# Patient Record
Sex: FEMALE | Race: WHITE
[De-identification: names, ages, dates, MRNs, and addresses within clinical notes are randomized per-mention and may not be internally consistent; named-entity substitution may affect disease eponyms.]

---

## 2017-09-13 ENCOUNTER — HOSPITAL ENCOUNTER (EMERGENCY)
Dept: HOSPITAL 10 - FTE | Age: 22
Discharge: HOME | End: 2017-09-13
Payer: COMMERCIAL

## 2017-09-13 VITALS — WEIGHT: 117.95 LBS | HEIGHT: 55 IN | BODY MASS INDEX: 27.3 KG/M2

## 2017-09-13 DIAGNOSIS — N39.0: Primary | ICD-10-CM

## 2017-09-13 LAB
ADD UMIC: YES
BACTERIA #/AREA URNS HPF: (no result) /HPF
COLOR UR: (no result)
GLUCOSE UR STRIP-MCNC: NEGATIVE MG/DL
KETONES UR STRIP.AUTO-MCNC: NEGATIVE MG/DL
NITRITE UR QL STRIP.AUTO: NEGATIVE MG/DL
RBC # UR AUTO: (no result) MG/DL
UR ASCORBIC ACID: NEGATIVE MG/DL
UR BILIRUBIN (DIP): NEGATIVE MG/DL
UR CLARITY: (no result)
UR PH (DIP): 5 (ref 5–9)
UR RBC: 5 /HPF (ref 0–5)
UR SPECIFIC GRAVITY (DIP): 1.01 (ref 1–1.03)
UR TOTAL PROTEIN (DIP): NEGATIVE MG/DL
UROBILINOGEN UR STRIP-ACNC: NEGATIVE MG/DL
WBC # UR STRIP: (no result) LEU/UL

## 2017-09-13 PROCEDURE — 99283 EMERGENCY DEPT VISIT LOW MDM: CPT

## 2017-09-13 PROCEDURE — 36415 COLL VENOUS BLD VENIPUNCTURE: CPT

## 2017-09-13 PROCEDURE — 81001 URINALYSIS AUTO W/SCOPE: CPT

## 2017-09-13 NOTE — ERD
ER Documentation


Chief Complaint


Date/Time


DATE: 17 


TIME: 09:07


Chief Complaint


ABD PAIN, LOOSE STOOLS, NAUSEA, ONSET LAST NIGHT





HPI


22 year old female Presents with lower abdominal pain that is cramping like 

diffuse, with loose stools and nausea beginning last night.  Patient describes 

the pain as being intermittent, and also reports possibly some stinging pain 

when she urinates.  She denies any fevers or chills.





ROS


All systems reviewed and are negative except as per history of present illness.





Medications


Home Meds


Active Scripts


Nitrofurantoin Monohyd Macrocr* (Macrobid*) 100 Mg Capsr, 100 MG PO BID for 7 

Days, CAP


   Prov:LIZ MCCLAIN PA-C         17


Ondansetron (Ondansetron Odt) 4 Mg Tab.rapdis, 4 MG PO Q6H Y for NAUSEA AND/OR 

VOMITING, #10 TAB


   Prov:LIZ MCCLAIN PA-C         17


Naproxen* (Naprosyn*) 500 Mg Tablet, 500 MG PO BID Y for PAIN AND/OR 

INFLAMMATION, #30 TAB


   Prov:LIZ MCCLAIN PA-C         16





Allergies


Allergies:  


Coded Allergies:  


     No Known Allergy (Unverified , 17)





PMhx/Soc


History of Surgery:  Yes (APPY ,)


Anesthesia Reaction:  No


Hx Neurological Disorder:  No


Hx Respiratory Disorders:  No


Hx Cardiac Disorders:  No


Hx Psychiatric Problems:  No


Hx Miscellaneous Medical Probl:  Yes


Hx Alcohol Use:  No


Hx Substance Use:  No


Hx Tobacco Use:  No


Smoking Status:  Never smoker





Physical Exam


Vitals





Vital Signs








  Date Time  Temp Pulse Resp B/P Pulse Ox O2 Delivery O2 Flow Rate FiO2


 


17 08:46 97.9 80 17 115/75 100   








Physical Exam


General: Well-developed, well-nourished.  The patient appears in no acute 

distress.


HEENT: Head is normocephalic, atraumatic. No scleral icterus


Neck: Supple.  Nontender.


Lungs: Clear to auscultation.  Normal air movement.


Heart: Regular rate and rhythm.  S1 and S2 are normal.  No murmurs, gallops, or 

rubs.


Abdomen: Soft, nontender, nondistended.  Bowel sounds are normoactive.  Scar 

from previous appendectomy present.


Extremities: No clubbing or cyanosis.  Normal pulses. Moving extremities x 4. 

No weakness.


Neurologic: Alert and oriented 3.  No focal deficits.


Skin: Normal turgor.  No rash or lesions.


Results 24 hrs





 Laboratory Tests








Test


  17


09:09


 


Urine Color STRAW 


 


Urine Clarity


  SLIGHTLY


CLOUDY


 


Urine pH 5.0 


 


Urine Specific Gravity 1.006 


 


Urine Ketones NEGATIVEmg/dL 


 


Urine Nitrite NEGATIVEmg/dL 


 


Urine Bilirubin NEGATIVEmg/dL 


 


Urine Urobilinogen NEGATIVEmg/dL 


 


Urine Leukocyte Esterase 3+Nadir/ul 


 


Urine Microscopic RBC 5/HPF 


 


Urine Microscopic WBC 28/HPF 


 


Urine Bacteria FEW/HPF 


 


Urine Hemoglobin 2+mg/dL 


 


Urine Glucose NEGATIVEmg/dL 


 


Urine Total Protein NEGATIVEmg/dl 








 Current Medications








 Medications


  (Trade)  Dose


 Ordered  Sig/Zara


 Route


 PRN Reason  Start Time


 Stop Time Status Last Admin


Dose Admin


 


 Ondansetron HCl


  (Zofran Odt)  4 mg  ONCE  STAT


 ODT


   17 08:56


 17 08:59 DC 17 09:12


 


 


 Acetaminophen


  (Tylenol Tab)  650 mg  ONCE  ONCE


 PO


   17 09:00


 17 09:06 DC 17 09:12


 


 


 Nitrofurantoin


 Macrocrystals


  (Macrobid)  100 mg  ONCE  ONCE


 PO


   17 10:30


 17 10:30 DC 17 10:12


 











Procedures/MDM


ED course:


She was given Tylenol for pain, as well as Zofran ODT.





Medical decision makin-year-old male presents with lower abdominal pain as 

cramping light with loose stools and nausea, Has evidence of a UTI.  She has 

lower abdominal pain with cramping with slight pain when she urinates, 

consistent with acute cystitis. Patient has a history of appendectomy.  Urine 

was also reviewed, she is not pregnant,Has evidence of a urinary tract 

infection.  There are no signs of acute or surgical abdominal process.  I doubt 

acute appendicitis, diverticulitis, bowel obstruction, acute cholecystitis, 

acute pancreatitis and among others were considered however unlikely..  Her 

vitals were reviewed and stable she is appropriate for outpatient management.





Departure


Diagnosis:  


 Primary Impression:  


 UTI (urinary tract infection)


Condition:  LIZ Koenig PA-C Sep 13, 2017 09:08

## 2018-06-07 ENCOUNTER — HOSPITAL ENCOUNTER (EMERGENCY)
Dept: HOSPITAL 91 - FTE | Age: 23
LOS: 1 days | Discharge: HOME | End: 2018-06-08
Payer: COMMERCIAL

## 2018-06-07 ENCOUNTER — HOSPITAL ENCOUNTER (EMERGENCY)
Age: 23
LOS: 1 days | Discharge: HOME | End: 2018-06-08

## 2018-06-07 DIAGNOSIS — N12: Primary | ICD-10-CM

## 2018-06-07 LAB
ADD MAN DIFF?: NO
ADD UMIC: YES
ALANINE AMINOTRANSFERASE: 18 IU/L (ref 13–69)
ALBUMIN/GLOBULIN RATIO: 1.2
ALBUMIN: 4.7 G/DL (ref 3.3–4.9)
ALKALINE PHOSPHATASE: 73 IU/L (ref 42–121)
ANION GAP: 16 (ref 8–16)
ASPARTATE AMINO TRANSFERASE: 23 IU/L (ref 15–46)
BASOPHIL #: 0 10^3/UL (ref 0–0.1)
BASOPHILS %: 0.3 % (ref 0–2)
BILIRUBIN,DIRECT: 0 MG/DL (ref 0–0.2)
BILIRUBIN,TOTAL: 0.4 MG/DL (ref 0.2–1.3)
BLOOD UREA NITROGEN: 9 MG/DL (ref 7–20)
CALCIUM: 9.7 MG/DL (ref 8.4–10.2)
CARBON DIOXIDE: 29 MMOL/L (ref 21–31)
CHLORIDE: 103 MMOL/L (ref 97–110)
CREATININE: 0.68 MG/DL (ref 0.44–1)
EOSINOPHILS #: 0 10^3/UL (ref 0–0.5)
EOSINOPHILS %: 0.2 % (ref 0–7)
GLOBULIN: 3.9 G/DL (ref 1.3–3.2)
GLUCOSE: 92 MG/DL (ref 70–220)
HEMATOCRIT: 41.9 % (ref 37–47)
HEMOGLOBIN: 13.5 G/DL (ref 12–16)
LIPASE: 72 U/L (ref 23–300)
LYMPHOCYTES #: 2.5 10^3/UL (ref 0.8–2.9)
LYMPHOCYTES %: 22.8 % (ref 15–51)
MEAN CORPUSCULAR HEMOGLOBIN: 27.6 PG (ref 29–33)
MEAN CORPUSCULAR HGB CONC: 32.2 G/DL (ref 32–37)
MEAN CORPUSCULAR VOLUME: 85.7 FL (ref 82–101)
MEAN PLATELET VOLUME: 10.5 FL (ref 7.4–10.4)
MONOCYTE #: 0.7 10^3/UL (ref 0.3–0.9)
MONOCYTES %: 6.4 % (ref 0–11)
NEUTROPHIL #: 7.6 10^3/UL (ref 1.6–7.5)
NEUTROPHILS %: 70 % (ref 39–77)
NUCLEATED RED BLOOD CELLS #: 0 10^3/UL (ref 0–0)
NUCLEATED RED BLOOD CELLS%: 0 /100WBC (ref 0–0)
PLATELET COUNT: 245 10^3/UL (ref 140–415)
POTASSIUM: 3.3 MMOL/L (ref 3.5–5.1)
RED BLOOD COUNT: 4.89 10^6/UL (ref 4.2–5.4)
RED CELL DISTRIBUTION WIDTH: 12.9 % (ref 11.5–14.5)
SODIUM: 145 MMOL/L (ref 135–144)
TOTAL PROTEIN: 8.6 G/DL (ref 6.1–8.1)
UR ASCORBIC ACID: NEGATIVE MG/DL
UR BACTERIA: (no result) /HPF
UR BILIRUBIN (DIP): NEGATIVE MG/DL
UR BLOOD (DIP): (no result) MG/DL
UR CLARITY: CLEAR
UR COLOR: COLORLESS
UR GLUCOSE (DIP): NEGATIVE MG/DL
UR KETONES (DIP): NEGATIVE MG/DL
UR LEUKOCYTE ESTERASE (DIP): (no result) LEU/UL
UR NITRITE (DIP): NEGATIVE MG/DL
UR PH (DIP): 8 (ref 5–9)
UR RBC: 0 /HPF (ref 0–5)
UR SPECIFIC GRAVITY (DIP): 1 (ref 1–1.03)
UR TOTAL PROTEIN (DIP): NEGATIVE MG/DL
UR UROBILINOGEN (DIP): NEGATIVE MG/DL
UR WBC: 7 /HPF (ref 0–5)
WHITE BLOOD COUNT: 10.8 10^3/UL (ref 4.8–10.8)

## 2018-06-07 PROCEDURE — 96372 THER/PROPH/DIAG INJ SC/IM: CPT

## 2018-06-07 PROCEDURE — 99285 EMERGENCY DEPT VISIT HI MDM: CPT

## 2018-06-07 PROCEDURE — 81001 URINALYSIS AUTO W/SCOPE: CPT

## 2018-06-07 PROCEDURE — 76705 ECHO EXAM OF ABDOMEN: CPT

## 2018-06-07 PROCEDURE — 81025 URINE PREGNANCY TEST: CPT

## 2018-06-07 PROCEDURE — 83690 ASSAY OF LIPASE: CPT

## 2018-06-07 PROCEDURE — 85025 COMPLETE CBC W/AUTO DIFF WBC: CPT

## 2018-06-07 PROCEDURE — 80053 COMPREHEN METABOLIC PANEL: CPT

## 2018-06-07 PROCEDURE — 36415 COLL VENOUS BLD VENIPUNCTURE: CPT

## 2018-06-07 RX ADMIN — HYDROCODONE BITARTRATE AND ACETAMINOPHEN 1 TAB: 5; 325 TABLET ORAL at 22:15

## 2018-06-08 RX ADMIN — CEFTRIAXONE SODIUM 1 GM: 1 INJECTION, POWDER, FOR SOLUTION INTRAMUSCULAR; INTRAVENOUS at 00:11

## 2018-06-08 RX ADMIN — LIDOCAINE HYDROCHLORIDE 1 ML: 20 INJECTION, SOLUTION INFILTRATION; PERINEURAL at 00:30

## 2018-06-20 ENCOUNTER — HOSPITAL ENCOUNTER (EMERGENCY)
Age: 23
Discharge: LEFT BEFORE DISCHARGE | End: 2018-06-20

## 2018-06-20 ENCOUNTER — HOSPITAL ENCOUNTER (EMERGENCY)
Dept: HOSPITAL 91 - E/R | Age: 23
Discharge: LEFT BEFORE DISCHARGE | End: 2018-06-20
Payer: SELF-PAY

## 2018-06-20 DIAGNOSIS — Z53.21: Primary | ICD-10-CM

## 2019-04-24 ENCOUNTER — OFFICE (OUTPATIENT)
Dept: URBAN - METROPOLITAN AREA CLINIC 67 | Facility: CLINIC | Age: 24
End: 2019-04-24

## 2019-04-24 VITALS — SYSTOLIC BLOOD PRESSURE: 110 MMHG | DIASTOLIC BLOOD PRESSURE: 60 MMHG | WEIGHT: 125 LBS | HEIGHT: 64 IN

## 2019-04-24 DIAGNOSIS — R19.7 DIARRHEA (UNSPECIFIED): ICD-10-CM

## 2019-04-24 DIAGNOSIS — K59.00 CONSTIPATION: ICD-10-CM

## 2019-04-24 PROCEDURE — 99244 OFF/OP CNSLTJ NEW/EST MOD 40: CPT | Performed by: INTERNAL MEDICINE

## 2019-04-24 NOTE — SERVICEHPINOTES
Patient is a very pleasant 24-year-old woman self-referred for evaluation of change in bowel movements. The patient states that up until 1–2 years ago she would have 1–2 bowel movements per day. Approximately 1-2 years ago she began developing constipation going up to 2 days without a bowel movement. Associated with this she would developed abdominal bloating and discomfort. For the last month the constipation has alternated with diarrhea with one loose bowel movement associated with bilateral lower quadrant cramping. Following this, she will cycle back to constipation. Interestingly, she states that with MiraLAX, she had no constipation or diarrhea. She denies any rectal bleeding. The symptoms do not awaken her from her sleep. There is no family history of colorectal malignancy, inflammatory bowel disease, or celiac disease.

## 2019-04-26 LAB
CELIAC DISEASE COMPREHENSIVE PANEL: IMMUNOGLOBULIN A: 269 MG/DL (ref 81–463)
CELIAC DISEASE COMPREHENSIVE PANEL: INTERPRETATION: (no result)
CELIAC DISEASE COMPREHENSIVE PANEL: TISSUE TRANSGLUTAMINASE AB, IGA: <1 U/ML

## 2019-05-09 LAB — FECAL GLOBIN BY IMMUNOCHEMISTRY: (no result)
